# Patient Record
Sex: MALE | Race: WHITE | NOT HISPANIC OR LATINO | Employment: STUDENT | URBAN - METROPOLITAN AREA
[De-identification: names, ages, dates, MRNs, and addresses within clinical notes are randomized per-mention and may not be internally consistent; named-entity substitution may affect disease eponyms.]

---
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
## 2024-05-15 ENCOUNTER — APPOINTMENT (OUTPATIENT)
Dept: PEDIATRICS | Facility: CLINIC | Age: 19
End: 2024-05-15
Payer: COMMERCIAL

## 2025-07-26 ENCOUNTER — HOSPITAL ENCOUNTER (EMERGENCY)
Age: 20
Discharge: HOME | End: 2025-07-26
Payer: COMMERCIAL

## 2025-07-26 VITALS
HEART RATE: 72 BPM | SYSTOLIC BLOOD PRESSURE: 137 MMHG | RESPIRATION RATE: 18 BRPM | DIASTOLIC BLOOD PRESSURE: 76 MMHG | OXYGEN SATURATION: 100 % | TEMPERATURE: 98.6 F | WEIGHT: 230 LBS

## 2025-07-26 DIAGNOSIS — L25.9 CONTACT DERMATITIS, UNSPECIFIED CONTACT DERMATITIS TYPE, UNSPECIFIED TRIGGER: Primary | ICD-10-CM

## 2025-07-26 PROCEDURE — 99211 OFF/OP EST MAY X REQ PHY/QHP: CPT

## 2025-07-26 RX ORDER — PREDNISONE 10 MG/1
TABLET ORAL
Qty: 18 TABLET | Refills: 0 | Status: SHIPPED | OUTPATIENT
Start: 2025-07-26

## 2025-07-26 ASSESSMENT — PAIN - FUNCTIONAL ASSESSMENT: PAIN_FUNCTIONAL_ASSESSMENT: NONE - DENIES PAIN

## 2025-07-26 NOTE — ED PROVIDER NOTES
[facility] URGENT CARE     NAME: Russ Cole  :  2005  MRN:  [id number]  Date of evaluation: 2025  Provider: Gray Orosco PA-C  PCP: No primary care provider on file.  Note Started : 2:26 PM EDT 25    Chief Complaint: Rash (Started almost a week ago, on left arm, thought it went away, then started back, does not itch, using calamine and ivy rest)      This is a 20-year-old male that presents to urgent care with his grandmother.  Patient has been having a red raised rash to his extremities after being out in the yard.  No difficulty breathing or swallowing.  No lightheadedness or dizziness.  No drainage or bleeding.  No fevers or chills.  On first contact patient he appears to be in no acute distress.        Review of Systems  Pertinent positives and negatives are stated within HPI, all other systems reviewed and are negative.     Allergies: Patient has no known allergies.     --------------------------------------------- PAST HISTORY ---------------------------------------------  Past Medical History:  has no past medical history on file.    Past Surgical History:  has a past surgical history that includes [place] tooth extraction.    Social History:  reports that he has never smoked. He has never used smokeless tobacco. He reports that he does not use drugs.    Family History: family history is not on file.     The patient’s home medications have been reviewed.    The nursing notes within the ED encounter have been reviewed.     ------------------------------------------------SCREENINGS----------------------------------------------                        CIWA Assessment  BP: 137/76  Pulse: 72           ---------------------------------------------PHYSICAL EXAM --------------------------------------------    Vitals:    25 1442 25 1443   BP:  137/76   Pulse:  72   Resp:  18   Temp:  98.6 °F (37 °C)   SpO2:  100%   Weight: 104.3 kg (230 lb)      Oxygen Saturation

## 2025-07-26 NOTE — DISCHARGE INSTRUCTIONS
Keep area clean with mild soap and water  Use allergy pill such as Claritin or Zyrtec or Benadryl  May use topical medication such as Benadryl cream calamine lotion or steroid cream